# Patient Record
Sex: MALE | Race: WHITE | Employment: UNEMPLOYED | URBAN - NONMETROPOLITAN AREA
[De-identification: names, ages, dates, MRNs, and addresses within clinical notes are randomized per-mention and may not be internally consistent; named-entity substitution may affect disease eponyms.]

---

## 2017-01-03 ENCOUNTER — OFFICE VISIT (OUTPATIENT)
Dept: ORTHOPEDICS | Facility: CLINIC | Age: 22
End: 2017-01-03
Payer: COMMERCIAL

## 2017-01-03 VITALS — HEIGHT: 70 IN | BODY MASS INDEX: 36.36 KG/M2 | WEIGHT: 254 LBS

## 2017-01-03 DIAGNOSIS — G89.29 CHRONIC RIGHT SHOULDER PAIN: Primary | ICD-10-CM

## 2017-01-03 DIAGNOSIS — M25.511 CHRONIC RIGHT SHOULDER PAIN: Primary | ICD-10-CM

## 2017-01-03 DIAGNOSIS — S43.431D SUPERIOR GLENOID LABRUM LESION OF RIGHT SHOULDER, SUBSEQUENT ENCOUNTER: ICD-10-CM

## 2017-01-03 PROCEDURE — 99213 OFFICE O/P EST LOW 20 MIN: CPT | Performed by: PHYSICIAN ASSISTANT

## 2017-01-05 ENCOUNTER — HOSPITAL ENCOUNTER (OUTPATIENT)
Dept: PHYSICAL THERAPY | Facility: CLINIC | Age: 22
Setting detail: THERAPIES SERIES
End: 2017-01-05
Attending: NURSE PRACTITIONER
Payer: COMMERCIAL

## 2017-01-05 PROCEDURE — 97110 THERAPEUTIC EXERCISES: CPT | Mod: GP | Performed by: PHYSICAL THERAPIST

## 2017-01-05 PROCEDURE — 40000718 ZZHC STATISTIC PT DEPARTMENT ORTHO VISIT: Performed by: PHYSICAL THERAPIST

## 2017-01-12 ENCOUNTER — HOSPITAL ENCOUNTER (OUTPATIENT)
Dept: PHYSICAL THERAPY | Facility: CLINIC | Age: 22
Setting detail: THERAPIES SERIES
End: 2017-01-12
Attending: NURSE PRACTITIONER
Payer: COMMERCIAL

## 2017-01-12 PROCEDURE — 97110 THERAPEUTIC EXERCISES: CPT | Mod: GP | Performed by: PHYSICAL THERAPIST

## 2017-01-12 PROCEDURE — 40000718 ZZHC STATISTIC PT DEPARTMENT ORTHO VISIT: Performed by: PHYSICAL THERAPIST

## 2017-01-19 ENCOUNTER — HOSPITAL ENCOUNTER (OUTPATIENT)
Dept: PHYSICAL THERAPY | Facility: CLINIC | Age: 22
Setting detail: THERAPIES SERIES
End: 2017-01-19
Attending: NURSE PRACTITIONER
Payer: COMMERCIAL

## 2017-01-19 PROCEDURE — 97110 THERAPEUTIC EXERCISES: CPT | Mod: GP | Performed by: PHYSICAL THERAPIST

## 2017-01-19 PROCEDURE — 40000718 ZZHC STATISTIC PT DEPARTMENT ORTHO VISIT: Performed by: PHYSICAL THERAPIST

## 2017-01-26 ENCOUNTER — HOSPITAL ENCOUNTER (OUTPATIENT)
Dept: PHYSICAL THERAPY | Facility: CLINIC | Age: 22
Setting detail: THERAPIES SERIES
End: 2017-01-26
Attending: NURSE PRACTITIONER
Payer: COMMERCIAL

## 2017-01-26 PROCEDURE — 97110 THERAPEUTIC EXERCISES: CPT | Mod: GP | Performed by: PHYSICAL THERAPIST

## 2017-01-26 PROCEDURE — 40000718 ZZHC STATISTIC PT DEPARTMENT ORTHO VISIT: Performed by: PHYSICAL THERAPIST

## 2017-02-02 ENCOUNTER — HOSPITAL ENCOUNTER (OUTPATIENT)
Dept: PHYSICAL THERAPY | Facility: CLINIC | Age: 22
Setting detail: THERAPIES SERIES
End: 2017-02-02
Attending: NURSE PRACTITIONER
Payer: COMMERCIAL

## 2017-02-02 PROCEDURE — 97110 THERAPEUTIC EXERCISES: CPT | Mod: GP | Performed by: PHYSICAL THERAPIST

## 2017-02-02 PROCEDURE — 40000718 ZZHC STATISTIC PT DEPARTMENT ORTHO VISIT: Performed by: PHYSICAL THERAPIST

## 2017-02-09 ENCOUNTER — HOSPITAL ENCOUNTER (OUTPATIENT)
Dept: PHYSICAL THERAPY | Facility: CLINIC | Age: 22
Setting detail: THERAPIES SERIES
End: 2017-02-09
Attending: NURSE PRACTITIONER
Payer: COMMERCIAL

## 2017-02-09 PROCEDURE — 40000718 ZZHC STATISTIC PT DEPARTMENT ORTHO VISIT: Performed by: PHYSICAL THERAPIST

## 2017-02-09 PROCEDURE — 97110 THERAPEUTIC EXERCISES: CPT | Mod: GP | Performed by: PHYSICAL THERAPIST

## 2017-02-16 ENCOUNTER — HOSPITAL ENCOUNTER (OUTPATIENT)
Dept: PHYSICAL THERAPY | Facility: CLINIC | Age: 22
Setting detail: THERAPIES SERIES
End: 2017-02-16
Attending: NURSE PRACTITIONER
Payer: COMMERCIAL

## 2017-02-16 PROCEDURE — 97110 THERAPEUTIC EXERCISES: CPT | Mod: GP | Performed by: PHYSICAL THERAPIST

## 2017-02-16 PROCEDURE — 40000718 ZZHC STATISTIC PT DEPARTMENT ORTHO VISIT: Performed by: PHYSICAL THERAPIST

## 2017-02-28 ENCOUNTER — HOSPITAL ENCOUNTER (OUTPATIENT)
Dept: PHYSICAL THERAPY | Facility: CLINIC | Age: 22
Setting detail: THERAPIES SERIES
End: 2017-02-28
Attending: NURSE PRACTITIONER
Payer: COMMERCIAL

## 2017-02-28 PROCEDURE — 40000718 ZZHC STATISTIC PT DEPARTMENT ORTHO VISIT: Performed by: PHYSICAL THERAPIST

## 2017-02-28 PROCEDURE — 97110 THERAPEUTIC EXERCISES: CPT | Mod: GP | Performed by: PHYSICAL THERAPIST

## 2017-02-28 NOTE — PROGRESS NOTES
Outpatient Physical Therapy Discharge Note     Patient: Martell Dinh  : 1995    Beginning/End Dates of Reporting Period:  2016 to 2017    Referring Provider: Jarred Bernal Diagnosis: R Shoulder Pain     Client Self Report: Patient reports his shoulder was painful last week, even without a repetitive motion. He  states intensity wasn't that high, but it did ache a little    Goals:  Goal Identifier HEP   Goal Description Patient will be independent and 75% compliant in progressive HEP to continue improving strength outside of therapy session   Target Date 16   Date Met  16   Progress:     Goal Identifier Sleep   Goal Description Patient will report ability to sleep on affected shoulder with no increase in shoulder pain   Target Date 17   Date Met  (P) 17 (patient states it occurs occasionally, tolerable.)   Progress:     Goal Identifier Exercise   Goal Description Patient will demonstrate knowledge of appropriate exercise program as to not increase shoulder pain   Target Date 17   Date Met  17   Progress:     Goal Identifier SPADI   Goal Description Patient will report improvement in Shoulder Pain and Disability index to less than 5% to decrease level of disability   Target Date 17   Date Met  17   Progress:     Goal Identifier     Goal Description     Target Date     Date Met      Progress:     Goal Identifier     Goal Description     Target Date     Date Met      Progress:     Goal Identifier     Goal Description     Target Date     Date Met      Progress:     Goal Identifier     Goal Description     Target Date     Date Met      Progress:     Progress Toward Goals:   Progress this reporting period: Pt has progressed to active strengthening within a pain free range and symptoms that are self managed by the patient. Pt has demonstrated gains with functional strength and indicated increased participation with work without  difficulty.          Plan:  Discharge from therapy.    Discharge:    Reason for Discharge: Patient has met all goals.    Equipment Issued: Blue Therapy Band    Discharge Plan: Patient to continue home program.

## 2017-07-26 ENCOUNTER — OFFICE VISIT (OUTPATIENT)
Dept: FAMILY MEDICINE | Facility: CLINIC | Age: 22
End: 2017-07-26
Payer: COMMERCIAL

## 2017-07-26 ENCOUNTER — RADIANT APPOINTMENT (OUTPATIENT)
Dept: GENERAL RADIOLOGY | Facility: CLINIC | Age: 22
End: 2017-07-26
Attending: NURSE PRACTITIONER
Payer: COMMERCIAL

## 2017-07-26 VITALS
DIASTOLIC BLOOD PRESSURE: 72 MMHG | HEIGHT: 70 IN | BODY MASS INDEX: 35.22 KG/M2 | TEMPERATURE: 98.6 F | WEIGHT: 246 LBS | SYSTOLIC BLOOD PRESSURE: 124 MMHG | HEART RATE: 72 BPM

## 2017-07-26 DIAGNOSIS — S69.91XA HAND INJURY, RIGHT, INITIAL ENCOUNTER: ICD-10-CM

## 2017-07-26 DIAGNOSIS — S62.346A CLOSED NONDISPLACED FRACTURE OF BASE OF FIFTH METACARPAL BONE OF RIGHT HAND, INITIAL ENCOUNTER: Primary | ICD-10-CM

## 2017-07-26 PROCEDURE — 73130 X-RAY EXAM OF HAND: CPT | Mod: RT

## 2017-07-26 PROCEDURE — 99214 OFFICE O/P EST MOD 30 MIN: CPT | Performed by: NURSE PRACTITIONER

## 2017-07-26 NOTE — PATIENT INSTRUCTIONS
Follow-up with orthopedics they will contact you for an appointment.    Rest the affected painful area as much as possible.  Apply ice for 15-20 minutes intermittently as needed and especially after any offending activity.    Daily stretching.  As pain recedes, begin normal activities slowly as tolerated.      Avoid rapid or heavy exertion for now. Activity as tolerated     Gentle stretching two to three times daily just to keep from stiffening up.      Alternate ice and heat, 20 minutes each for 2-3 cycles.  Gel packs work best for cold. Uncooked rice is best for heat.  Fill an old, clean sock and tie or sew up.  Microwave for 30-45 seconds. Careful not to burn.    Gradually ease back into activity as it gets better.  Consider Physical Therapy if symptoms not better with symptomatic care. Will need to follow-up with your primary care provider for a referral if not improving,        Boxer Fracture  You have a fracture, or break, of one of the bones in your hand. This causes pain, swelling, and sometimes bruising. This injury is treated with a splint or cast. It takes about 4 to 6 weeks to heal. Surgery may be needed for severe injuries.    If there are wounds near the fractured joint from hitting someone in the mouth, antibiotics may be required to prevent an infection. After the bone has healed, it is common for one knuckle to be slightly lower than the others, even if the bone was set. This may be seen only when you make a fist. It usually won t affect hand function.  Home care    Keep your arm elevated to reduce pain and swelling. When sitting or lying down, elevate your arm above the level of your heart. You can do this by placing your arm on a pillow that rests on your chest or on a pillow at your side. This is most important during the first 48 hours after injury.    Apply an ice pack over the injured area for no more than 20 minutes. Do this every 3 to 6 hours for the first 24 to 48 hours. To make an ice  pack, put ice cubes in a plastic bag that seals at the top. Wrap the bag in a clean, thin towel or cloth. Never put ice or an ice pack directly on the skin. You can place the ice pack inside the sling and directly over the splint or cast. As the ice melts, be careful that the cast or splint doesn t get wet.    Keep the cast or splint dry at all times. Bathe with your cast or splint out of the water, protected with 2 large plastic bags. Place 1 bag around the other. Tape each bag with duct tape at the top end. Even when the cast or splint is covered, water can leak in. So it's best to keep the cast or splint away from water. If a fiberglass cast or splint gets wet, you can dry it with a hair dryer on a cool setting.    You may use over-the-counter pain medicine to control pain, unless another pain medicine was prescribed. Talk with your providerbefore using these medicines if you have chronic liver or kidney disease, or ever had a stomach ulcer or GI bleeding.    If you cut, punctured, or scraped your hand during this injury, there is a risk of infection. Watch for signs of infection listed below. Finish any antibiotics prescribed.  Follow-up care  Follow up with your healthcare provider within 1 week, or as advised. This is to be sure the bone is healing as it should.   If X-rays were taken, you will be told of any new findings that may affect your care.  When to seek medical advice  Call your healthcare provider right away if any of these occur:    The cast or splint becomes wet or soft    The cast becomes loose    There is increased tightness or pain under the cast or splint    Your fingers become swollen, cold, blue, numb or tingly    The splint or cast has a bad smell, or wound drainage stains the cast    You have signs of infection: Fever, redness, warmth, swelling, or drainage from the wound    You have a fever of 100.4 F (38 C) or above lasting for 24 to 48 hours  Date Last Reviewed: 11/25/2015 2000-2017  [employer]. [address] [place] [address]. All rights reserved. This information is not intended as a substitute for professional medical care. Always follow your healthcare professional's instructions.        Boxer s Fracture  A boxer s fracture is a break in a bone in outer edge of the hand. The bone is under the pinky finger bones. Boxer s fracture gets its name because it is often caused by punching a hard surface with the fist.  Understanding the bones of the hand  The bones of your hand are called metacarpal bones. They connect the bones of your fingers (phalanges) to the bones of your wrist (carpals). The fifth metacarpal is the metacarpal of the fifth finger (pinky). A metacarpal bone has a long section of the bone (shaft) connected to the end of the bone. The area where the shaft connects to the end of the bone is called the neck. The neck is the weakest point of the bone. This is where a boxer s fracture happens.  What causes boxer s fracture?  You may have a boxer s fracture from an injury. This most often happens from punching a hard object, such as a punching bag, wall, or other firm surface. You may also get a boxer s fracture if you fall on your closed fist.  Symptoms of boxer s fracture  Symptoms of a boxer s fracture can include:    Painful bruising and swelling of the hand    Bent, claw-like pinky finger that is out of its normal position    Limited movement (range of motion) of the ring (fourth) and pinky fingers    Change in the shape of the knuckle  Diagnosing boxer s fracture  Your health care provider will ask about your symptoms and about how you injured the hand. He or she will also examine your hand. You may have an X-ray of the hand. This uses a small amount of radiation to create an image of the bones.  Treatment for boxer s fracture  Your health care provider may refer you to a hand orthopedist. This is a doctor who treats hand problems. Your treatment may  first include:    Cleaning any cuts    A tetanus vaccine, if you have cuts    Resting your hand and keeping it raised (elevated) as much as possible for a few days    Putting ice on it throughout the day    Taking prescription or over-the-counter pain medicine    Wearing a splint for several weeks  You may need to have your bones put back into position. You may have a local pain medicine to keep you from feeling pain during this process. Your health care provider will then move the bones back into place.  Surgery for boxer s fracture  You made need surgery. This is done by a hand surgeon. The surgeon makes a cut (incision) in the skin in order to put your bones back into place. You may need surgery if:    The bone has broken through the skin    The bone is broken in several places    You use your hands and fingers for your work. For example, if you are a musician, craftsman, or .    Your hand doesn t heal normally  After surgery, you will have physical therapy to help you heal. The therapy includes treatments and exercises.  What happens if you don t get treated?  An untreated boxer s fracture can cause problems such as:    You may be less able to  objects.    You may not be able to move your hand or finger as much as you did before the injury.    Your finger may not look normal.  Preventing boxer s fracture  If you box, make sure you use the correct technique and the proper equipment.  How to manage boxer s fracture  Your health care provider may tell you to:    Keep your splint from getting wet.    Keep your bones strong and help your fracture heal. Eat foods with vitamin D, calcium, and protein.    Stop smoking. If you smoke, your fracture may not heal as quickly.    Be careful while your hand heals. You may need to use a brace for a while.     When to call the health care provider  Call your health care provider right away if you have any of these:    Numbness or tingling in your fingers    Fingers that  look blue    Pain or swelling that gets worse    Problems with your splint    Skin in the area is that is red, painful, or swollen   Date Last Reviewed: 3/19/2015    3436-3821 [employer]. [address] [place] [address], [place] [address]. All rights reserved. This information is not intended as a substitute for professional medical care. Always follow your healthcare professional's instructions.

## 2017-07-26 NOTE — PROGRESS NOTES
"  SUBJECTIVE:                                                    Martell Dinh is a 21 year old male who presents to clinic today for the following health issues:    Hand Pain    Onset: one week    Description:   Location: right hand  Character: Dull ache    Intensity: moderate    Progression of Symptoms: same    Accompanying Signs & Symptoms:  Other symptoms: swelling    History:   Previous similar pain: no       Precipitating factors:   Trauma or overuse: YES- punched the side of a trailer    Alleviating factors:  Improved by: nothing    Therapies Tried and outcome: ice  Patient is right handed         History reviewed. No pertinent past medical history.    Social History   Substance Use Topics     Smoking status: Never Smoker     Smokeless tobacco: Never Used      Comment: mom smokes     Alcohol use No       ROS:  CONSTITUTIONAL:NEGATIVE for fever, chills, change in weight  INTEGUMENTARY/SKIN: NEGATIVE for worrisome rashes, moles or lesions  EYES: NEGATIVE for vision changes or irritation  ENT/MOUTH: NEGATIVE for ear, mouth and throat problems  RESP:NEGATIVE for significant cough or SOB  CV: NEGATIVE for chest pain, palpitations or peripheral edema  MUSCULOSKELETAL: NEGATIVE for significant arthralgias or myalgia  NEURO: NEGATIVE for weakness, dizziness or paresthesias      OBJECTIVE:  Blood pressure 124/72, pulse 72, temperature 98.6  F (37  C), temperature source Tympanic, height 5' 9.5\" (1.765 m), weight 246 lb (111.6 kg).   EXAM:  Constitutional: healthy, alert and no distress   Cardiovascular: negative, PMI normal. No lifts, heaves, or thrills. RRR. No murmurs, clicks gallops or rub  Respiratory: negative, Percussion normal. Good diaphragmatic excursion. Lungs clear  NEURO: Gait normal. Reflexes normal and symmetric. Sensation grossly WNL.    Appearance: in no apparent distress.  Hand exam: soft tissue tenderness and swelling at the base of the 5 metacarpal, reduced range of motion of 5th finger , " scaphoid (snuffbox) tenderness absent, radial pulse normal, sensation normal, remainder of ipsilateral wrist, hand and finger exam is normal, normal contralateral hand and wrist, normal ipsilateral elbow.    X-ray:  RIGHT HAND THREE VIEWS  7/26/2017 4:28 PM     HISTORY:  Pain after injury to the fourth and fifth metacarpals.     COMPARISON:  9/29/2011.     FINDINGS:  There has been interval reduction of the previously seen  dislocation of the PIP joint of the little finger. There is an  approximately 0.4 cm ossification along the volar radial aspect of the  base of the middle phalanx of the little finger which may be an old  ununited fracture fragment. There is an acute appearing transverse  fracture through the distal fifth metacarpal with mild volar  angulation of the distal fragment. No significant displacement is  seen. No other change or abnormality is noted.         IMPRESSION:  Acute fracture of the distal fifth metacarpal.     ASSESSMENT:    ICD-10-CM    1. Closed nondisplaced fracture of base of fifth metacarpal bone of right hand, initial encounter S62.346A ORTHO  REFERRAL   2. Hand injury, right, initial encounter S69.91XA XR Hand Right G/E 3 Views         PLAN:  Patient placed in a boxer splint made by myself.  Will have him follow-up with Orthopedics   Patient Instructions     Follow-up with orthopedics they will contact you for an appointment.    Rest the affected painful area as much as possible.  Apply ice for 15-20 minutes intermittently as needed and especially after any offending activity.    Daily stretching.  As pain recedes, begin normal activities slowly as tolerated.      Avoid rapid or heavy exertion for now. Activity as tolerated     Gentle stretching two to three times daily just to keep from stiffening up.      Alternate ice and heat, 20 minutes each for 2-3 cycles.  Gel packs work best for cold. Uncooked rice is best for heat.  Fill an old, clean sock and tie or sew up.   Microwave for 30-45 seconds. Careful not to burn.    Gradually ease back into activity as it gets better.  Consider Physical Therapy if symptoms not better with symptomatic care. Will need to follow-up with your primary care provider for a referral if not improving,        Boxer Fracture  You have a fracture, or break, of one of the bones in your hand. This causes pain, swelling, and sometimes bruising. This injury is treated with a splint or cast. It takes about 4 to 6 weeks to heal. Surgery may be needed for severe injuries.    If there are wounds near the fractured joint from hitting someone in the mouth, antibiotics may be required to prevent an infection. After the bone has healed, it is common for one knuckle to be slightly lower than the others, even if the bone was set. This may be seen only when you make a fist. It usually won t affect hand function.  Home care    Keep your arm elevated to reduce pain and swelling. When sitting or lying down, elevate your arm above the level of your heart. You can do this by placing your arm on a pillow that rests on your chest or on a pillow at your side. This is most important during the first 48 hours after injury.    Apply an ice pack over the injured area for no more than 20 minutes. Do this every 3 to 6 hours for the first 24 to 48 hours. To make an ice pack, put ice cubes in a plastic bag that seals at the top. Wrap the bag in a clean, thin towel or cloth. Never put ice or an ice pack directly on the skin. You can place the ice pack inside the sling and directly over the splint or cast. As the ice melts, be careful that the cast or splint doesn t get wet.    Keep the cast or splint dry at all times. Bathe with your cast or splint out of the water, protected with 2 large plastic bags. Place 1 bag around the other. Tape each bag with duct tape at the top end. Even when the cast or splint is covered, water can leak in. So it's best to keep the cast or splint away from  water. If a fiberglass cast or splint gets wet, you can dry it with a hair dryer on a cool setting.    You may use over-the-counter pain medicine to control pain, unless another pain medicine was prescribed. Talk with your providerbefore using these medicines if you have chronic liver or kidney disease, or ever had a stomach ulcer or GI bleeding.    If you cut, punctured, or scraped your hand during this injury, there is a risk of infection. Watch for signs of infection listed below. Finish any antibiotics prescribed.  Follow-up care  Follow up with your healthcare provider within 1 week, or as advised. This is to be sure the bone is healing as it should.   If X-rays were taken, you will be told of any new findings that may affect your care.  When to seek medical advice  Call your healthcare provider right away if any of these occur:    The cast or splint becomes wet or soft    The cast becomes loose    There is increased tightness or pain under the cast or splint    Your fingers become swollen, cold, blue, numb or tingly    The splint or cast has a bad smell, or wound drainage stains the cast    You have signs of infection: Fever, redness, warmth, swelling, or drainage from the wound    You have a fever of 100.4 F (38 C) or above lasting for 24 to 48 hours  Date Last Reviewed: 11/25/2015 2000-2017 [employer]. [address] [place] [address]. All rights reserved. This information is not intended as a substitute for professional medical care. Always follow your healthcare professional's instructions.        Boxer s Fracture  A boxer s fracture is a break in a bone in outer edge of the hand. The bone is under the pinky finger bones. Boxer s fracture gets its name because it is often caused by punching a hard surface with the fist.  Understanding the bones of the hand  The bones of your hand are called metacarpal bones. They connect the bones of your fingers (phalanges) to the bones of your  wrist (carpals). The fifth metacarpal is the metacarpal of the fifth finger (pinky). A metacarpal bone has a long section of the bone (shaft) connected to the end of the bone. The area where the shaft connects to the end of the bone is called the neck. The neck is the weakest point of the bone. This is where a boxer s fracture happens.  What causes boxer s fracture?  You may have a boxer s fracture from an injury. This most often happens from punching a hard object, such as a punching bag, wall, or other firm surface. You may also get a boxer s fracture if you fall on your closed fist.  Symptoms of boxer s fracture  Symptoms of a boxer s fracture can include:    Painful bruising and swelling of the hand    Bent, claw-like pinky finger that is out of its normal position    Limited movement (range of motion) of the ring (fourth) and pinky fingers    Change in the shape of the knuckle  Diagnosing boxer s fracture  Your health care provider will ask about your symptoms and about how you injured the hand. He or she will also examine your hand. You may have an X-ray of the hand. This uses a small amount of radiation to create an image of the bones.  Treatment for boxer s fracture  Your health care provider may refer you to a hand orthopedist. This is a doctor who treats hand problems. Your treatment may first include:    Cleaning any cuts    A tetanus vaccine, if you have cuts    Resting your hand and keeping it raised (elevated) as much as possible for a few days    Putting ice on it throughout the day    Taking prescription or over-the-counter pain medicine    Wearing a splint for several weeks  You may need to have your bones put back into position. You may have a local pain medicine to keep you from feeling pain during this process. Your health care provider will then move the bones back into place.  Surgery for boxer s fracture  You made need surgery. This is done by a hand surgeon. The surgeon makes a cut (incision)  in the skin in order to put your bones back into place. You may need surgery if:    The bone has broken through the skin    The bone is broken in several places    You use your hands and fingers for your work. For example, if you are a musician, craftsman, or .    Your hand doesn t heal normally  After surgery, you will have physical therapy to help you heal. The therapy includes treatments and exercises.  What happens if you don t get treated?  An untreated boxer s fracture can cause problems such as:    You may be less able to  objects.    You may not be able to move your hand or finger as much as you did before the injury.    Your finger may not look normal.  Preventing boxer s fracture  If you box, make sure you use the correct technique and the proper equipment.  How to manage boxer s fracture  Your health care provider may tell you to:    Keep your splint from getting wet.    Keep your bones strong and help your fracture heal. Eat foods with vitamin D, calcium, and protein.    Stop smoking. If you smoke, your fracture may not heal as quickly.    Be careful while your hand heals. You may need to use a brace for a while.     When to call the health care provider  Call your health care provider right away if you have any of these:    Numbness or tingling in your fingers    Fingers that look blue    Pain or swelling that gets worse    Problems with your splint    Skin in the area is that is red, painful, or swollen   Date Last Reviewed: 3/19/2015    [id number] [employer]. [address] [place] [address]. All rights reserved. This information is not intended as a substitute for professional medical care. Always follow your healthcare professional's instructions.              SPENSER Singh CNP

## 2017-07-26 NOTE — NURSING NOTE
"Chief Complaint   Patient presents with     Hand Injury       Initial /72 (BP Location: Right arm, Cuff Size: Adult Large)  Pulse 72  Temp 98.6  F (37  C) (Tympanic)  Ht 5' 9.5\" (1.765 m)  Wt 246 lb (111.6 kg)  BMI 35.81 kg/m2 Estimated body mass index is 35.81 kg/(m^2) as calculated from the following:    Height as of this encounter: 5' 9.5\" (1.765 m).    Weight as of this encounter: 246 lb (111.6 kg).  Medication Reconciliation: complete    Health Maintenance that is potentially due pending provider review:  NONE    Lizabeth Saavedra MA     Is there anyone who you would like to be able to receive your results? No  If yes have patient fill out SCOTT    Lizabeth Saavedra MA     "

## 2017-07-26 NOTE — MR AVS SNAPSHOT
After Visit Summary   7/26/2017    Martell Dinh    MRN: [id number]           Patient Information     Date Of Birth          1995        Visit Information        Provider Department      7/26/2017 4:00 PM Kamille Antunez APRN [facility]        Today's Diagnoses     Hand injury, right, initial encounter    -  1    Closed displaced fracture of neck of fifth metacarpal bone of right hand, initial encounter          Care Instructions    Follow-up with orthopedics they will contact you for an appointment.    Rest the affected painful area as much as possible.  Apply ice for 15-20 minutes intermittently as needed and especially after any offending activity.    Daily stretching.  As pain recedes, begin normal activities slowly as tolerated.      Avoid rapid or heavy exertion for now. Activity as tolerated     Gentle stretching two to three times daily just to keep from stiffening up.      Alternate ice and heat, 20 minutes each for 2-3 cycles.  Gel packs work best for cold. Uncooked rice is best for heat.  Fill an old, clean sock and tie or sew up.  Microwave for 30-45 seconds. Careful not to burn.    Gradually ease back into activity as it gets better.  Consider Physical Therapy if symptoms not better with symptomatic care. Will need to follow-up with your primary care provider for a referral if not improving,        Boxer Fracture  You have a fracture, or break, of one of the bones in your hand. This causes pain, swelling, and sometimes bruising. This injury is treated with a splint or cast. It takes about 4 to 6 weeks to heal. Surgery may be needed for severe injuries.    If there are wounds near the fractured joint from hitting someone in the mouth, antibiotics may be required to prevent an infection. After the bone has healed, it is common for one knuckle to be slightly lower than the others, even if the bone was set. This may be seen only when you make a fist. It  usually won t affect hand function.  Home care    Keep your arm elevated to reduce pain and swelling. When sitting or lying down, elevate your arm above the level of your heart. You can do this by placing your arm on a pillow that rests on your chest or on a pillow at your side. This is most important during the first 48 hours after injury.    Apply an ice pack over the injured area for no more than 20 minutes. Do this every 3 to 6 hours for the first 24 to 48 hours. To make an ice pack, put ice cubes in a plastic bag that seals at the top. Wrap the bag in a clean, thin towel or cloth. Never put ice or an ice pack directly on the skin. You can place the ice pack inside the sling and directly over the splint or cast. As the ice melts, be careful that the cast or splint doesn t get wet.    Keep the cast or splint dry at all times. Bathe with your cast or splint out of the water, protected with 2 large plastic bags. Place 1 bag around the other. Tape each bag with duct tape at the top end. Even when the cast or splint is covered, water can leak in. So it's best to keep the cast or splint away from water. If a fiberglass cast or splint gets wet, you can dry it with a hair dryer on a cool setting.    You may use over-the-counter pain medicine to control pain, unless another pain medicine was prescribed. Talk with your providerbefore using these medicines if you have chronic liver or kidney disease, or ever had a stomach ulcer or GI bleeding.    If you cut, punctured, or scraped your hand during this injury, there is a risk of infection. Watch for signs of infection listed below. Finish any antibiotics prescribed.  Follow-up care  Follow up with your healthcare provider within 1 week, or as advised. This is to be sure the bone is healing as it should.   If X-rays were taken, you will be told of any new findings that may affect your care.  When to seek medical advice  Call your healthcare provider right away if any of  these occur:    The cast or splint becomes wet or soft    The cast becomes loose    There is increased tightness or pain under the cast or splint    Your fingers become swollen, cold, blue, numb or tingly    The splint or cast has a bad smell, or wound drainage stains the cast    You have signs of infection: Fever, redness, warmth, swelling, or drainage from the wound    You have a fever of 100.4 F (38 C) or above lasting for 24 to 48 hours  Date Last Reviewed: 11/25/2015 2000-2017 [employer]. [address] [place], [place] [address]. All rights reserved. This information is not intended as a substitute for professional medical care. Always follow your healthcare professional's instructions.        Boxer s Fracture  A boxer s fracture is a break in a bone in outer edge of the hand. The bone is under the pinky finger bones. Boxer s fracture gets its name because it is often caused by punching a hard surface with the fist.  Understanding the bones of the hand  The bones of your hand are called metacarpal bones. They connect the bones of your fingers (phalanges) to the bones of your wrist (carpals). The fifth metacarpal is the metacarpal of the fifth finger (pinky). A metacarpal bone has a long section of the bone (shaft) connected to the end of the bone. The area where the shaft connects to the end of the bone is called the neck. The neck is the weakest point of the bone. This is where a boxer s fracture happens.  What causes boxer s fracture?  You may have a boxer s fracture from an injury. This most often happens from punching a hard object, such as a punching bag, wall, or other firm surface. You may also get a boxer s fracture if you fall on your closed fist.  Symptoms of boxer s fracture  Symptoms of a boxer s fracture can include:    Painful bruising and swelling of the hand    Bent, claw-like pinky finger that is out of its normal position    Limited movement (range of motion) of the ring  (fourth) and pinky fingers    Change in the shape of the knuckle  Diagnosing boxer s fracture  Your health care provider will ask about your symptoms and about how you injured the hand. He or she will also examine your hand. You may have an X-ray of the hand. This uses a small amount of radiation to create an image of the bones.  Treatment for boxer s fracture  Your health care provider may refer you to a hand orthopedist. This is a doctor who treats hand problems. Your treatment may first include:    Cleaning any cuts    A tetanus vaccine, if you have cuts    Resting your hand and keeping it raised (elevated) as much as possible for a few days    Putting ice on it throughout the day    Taking prescription or over-the-counter pain medicine    Wearing a splint for several weeks  You may need to have your bones put back into position. You may have a local pain medicine to keep you from feeling pain during this process. Your health care provider will then move the bones back into place.  Surgery for boxer s fracture  You made need surgery. This is done by a hand surgeon. The surgeon makes a cut (incision) in the skin in order to put your bones back into place. You may need surgery if:    The bone has broken through the skin    The bone is broken in several places    You use your hands and fingers for your work. For example, if you are a musician, craftsman, or .    Your hand doesn t heal normally  After surgery, you will have physical therapy to help you heal. The therapy includes treatments and exercises.  What happens if you don t get treated?  An untreated boxer s fracture can cause problems such as:    You may be less able to  objects.    You may not be able to move your hand or finger as much as you did before the injury.    Your finger may not look normal.  Preventing boxer s fracture  If you box, make sure you use the correct technique and the proper equipment.  How to manage boxer s fracture  Your  health care provider may tell you to:    Keep your splint from getting wet.    Keep your bones strong and help your fracture heal. Eat foods with vitamin D, calcium, and protein.    Stop smoking. If you smoke, your fracture may not heal as quickly.    Be careful while your hand heals. You may need to use a brace for a while.     When to call the health care provider  Call your health care provider right away if you have any of these:    Numbness or tingling in your fingers    Fingers that look blue    Pain or swelling that gets worse    Problems with your splint    Skin in the area is that is red, painful, or swollen   Date Last Reviewed: 3/19/2015    8483-4183 [employer]. [address] [place] [address], [place] [address]. All rights reserved. This information is not intended as a substitute for professional medical care. Always follow your healthcare professional's instructions.                Follow-ups after your visit        Additional Services     ORTHO  REFERRAL       [facility] is referring you to the Orthopedic  Services at [facility] [place].        The  Representative will assist you in the coordination of your Orthopedic and Musculoskeletal Care as prescribed by your physician.    The  Representative will call you within 1 business day to help schedule your appointment, or you may contact the  Representative at:    All areas ~ [phone]     Type of Referral : Non Surgical       Timeframe requested: 1 - 2 days Hand specialist     Coverage of these services is subject to the terms and limitations of your health insurance plan.  Please call member services at your health plan with any benefit or coverage questions.      If X-rays, CT or MRI's have been performed, please contact the facility where they were done to arrange for , prior to your scheduled appointment.  Please bring this referral request to your  "appointment and present it to your specialist.                  Who to contact     If you have questions or need follow up information about today's clinic visit or your schedule please contact [facility] directly at [phone].  Normal or non-critical lab and imaging results will be communicated to you by MyChart, letter or phone within 4 business days after the clinic has received the results. If you do not hear from us within 7 days, please contact the clinic through MyChart or phone. If you have a critical or abnormal lab result, we will notify you by phone as soon as possible.  Submit refill requests through [employer] or call your pharmacy and they will forward the refill request to us. Please allow 3 business days for your refill to be completed.          Additional Information About Your Visit        [employer] [facility] Center[facility] Information     [employer] lets you send messages to your doctor, view your test results, renew your prescriptions, schedule appointments and more. To sign up, go to [email or web address] . Click on \"Log in\" on the left side of the screen, which will take you to the Welcome page. Then click on \"Sign up Now\" on the right side of the page.     You will be asked to enter the access code listed below, as well as some personal information. Please follow the directions to create your username and password.     Your access code is: [email or web address]  Expires: 10/24/2017  5:07 PM     Your access code will  in 90 days. If you need help or a new code, please call your [facility] or [phone].        Care EveryWhere ID     This is your Care EveryWhere ID. This could be used by other organizations to access your [facility] medical records  [id number]        Your Vitals Were     Pulse Temperature Height BMI (Body Mass Index)          72 98.6  F (37  C) (Tympanic) 5' 9.5\" (1.765 m) 35.81 kg/m2         Blood Pressure from Last 3 Encounters:   17 124/72   16 115/75   16 106/55    " Weight from Last 3 Encounters:   07/26/17 246 lb (111.6 kg) (>99 %)*   01/03/17 254 lb (115.2 kg) (>99 %)*   12/09/16 254 lb (115.2 kg) (>99 %)*     * Growth percentiles are based on [facility] 2-20 Years data.              We Performed the Following     ORTHO  REFERRAL        Primary Care Provider Office Phone # Fax #    Gianfranco Jaffe [phone]       [facility] [address] [facility] [address]        Equal Access to Services     [facility] Center[facility] : Hadii aad ku hadasho Soomaali, waaxda luqadaha, qaybta kaalmada adelisethyarobert, ruthann alcantara . So [facility] [phone].    ATENCIÓN: Si habla español, tiene a langford disposición servicios gratuitos de asistencia lingüística. Llame al [phone].    We comply with applicable federal civil rights laws and [place] laws. We do not discriminate on the basis of race, color, national origin, age, disability sex, sexual orientation or gender identity.            Thank you!     Thank you for choosing [facility]  for your care. Our goal is always to provide you with excellent care. Hearing back from our patients is one way we can continue to improve our services. Please take a few minutes to complete the written survey that you may receive in the mail after your visit with us. Thank you!             Your Updated Medication List - Protect others around you: Learn how to safely use, store and throw away your medicines at www.disposemymeds.org.      Notice  As of 7/26/2017  5:07 PM    You have not been prescribed any medications.

## 2017-07-27 ASSESSMENT — ANXIETY QUESTIONNAIRES
1. FEELING NERVOUS, ANXIOUS, OR ON EDGE: NOT AT ALL
7. FEELING AFRAID AS IF SOMETHING AWFUL MIGHT HAPPEN: NOT AT ALL
5. BEING SO RESTLESS THAT IT IS HARD TO SIT STILL: NOT AT ALL
GAD7 TOTAL SCORE: 0
2. NOT BEING ABLE TO STOP OR CONTROL WORRYING: NOT AT ALL
3. WORRYING TOO MUCH ABOUT DIFFERENT THINGS: NOT AT ALL
6. BECOMING EASILY ANNOYED OR IRRITABLE: NOT AT ALL
IF YOU CHECKED OFF ANY PROBLEMS ON THIS QUESTIONNAIRE, HOW DIFFICULT HAVE THESE PROBLEMS MADE IT FOR YOU TO DO YOUR WORK, TAKE CARE OF THINGS AT HOME, OR GET ALONG WITH OTHER PEOPLE: NOT DIFFICULT AT ALL

## 2017-07-27 ASSESSMENT — PATIENT HEALTH QUESTIONNAIRE - PHQ9: 5. POOR APPETITE OR OVEREATING: NOT AT ALL

## 2017-07-28 ASSESSMENT — ANXIETY QUESTIONNAIRES: GAD7 TOTAL SCORE: 0

## 2017-07-28 ASSESSMENT — PATIENT HEALTH QUESTIONNAIRE - PHQ9: SUM OF ALL RESPONSES TO PHQ QUESTIONS 1-9: 0

## 2018-06-12 ENCOUNTER — OFFICE VISIT (OUTPATIENT)
Dept: FAMILY MEDICINE | Facility: CLINIC | Age: 23
End: 2018-06-12
Payer: COMMERCIAL

## 2018-06-12 VITALS
DIASTOLIC BLOOD PRESSURE: 94 MMHG | HEART RATE: 88 BPM | TEMPERATURE: 97.8 F | WEIGHT: 249 LBS | SYSTOLIC BLOOD PRESSURE: 136 MMHG | RESPIRATION RATE: 18 BRPM | BODY MASS INDEX: 35.65 KG/M2 | HEIGHT: 70 IN

## 2018-06-12 DIAGNOSIS — E66.812 CLASS 2 OBESITY WITHOUT SERIOUS COMORBIDITY WITH BODY MASS INDEX (BMI) OF 36.0 TO 36.9 IN ADULT, UNSPECIFIED OBESITY TYPE: ICD-10-CM

## 2018-06-12 DIAGNOSIS — Z00.00 ROUTINE GENERAL MEDICAL EXAMINATION AT A HEALTH CARE FACILITY: Primary | ICD-10-CM

## 2018-06-12 DIAGNOSIS — R03.0 ELEVATED BLOOD PRESSURE READING WITHOUT DIAGNOSIS OF HYPERTENSION: ICD-10-CM

## 2018-06-12 PROCEDURE — 99395 PREV VISIT EST AGE 18-39: CPT | Performed by: FAMILY MEDICINE

## 2018-06-12 NOTE — PROGRESS NOTES
SUBJECTIVE:   CC: Martell Dinh is an 22 year old male who presents for preventative health visit.     Healthy Habits:    Do you get at least three servings of calcium containing foods daily (dairy, green leafy vegetables, etc.)? yes    Amount of exercise or daily activities, outside of work: 5 day(s) per week    Problems taking medications regularly not applicable    Medication side effects: No    Have you had an eye exam in the past two years? yes    Do you see a dentist twice per year? yes    Do you have sleep apnea, excessive snoring or daytime drowsiness?no       PROBLEMS TO ADD ON...  None    Today's PHQ-2 Score:   PHQ-2 ( 1999 Pf[employer]) 6/12/2018   Q1: Little interest or pleasure in doing things 0   Q2: Feeling down, depressed or hopeless 0   PHQ-2 Score 0       Abuse: Current or Past(Physical, Sexual or Emotional)- No  Do you feel safe in your environment - Yes    Social History   Substance Use Topics     Smoking status: Never Smoker     Smokeless tobacco: Never Used      Comment: mom smokes     Alcohol use Yes      Comment: ocassional      If you drink alcohol do you typically have >3 drinks per day or >7 drinks per week? No                      Last PSA: No results found for: PSA    Reviewed orders with patient. Reviewed health maintenance and updated orders accordingly - Yes  Labs reviewed in EPIC  BP Readings from Last 3 Encounters:   06/12/18 (!) 136/94   07/26/17 124/72   12/09/16 115/75    Wt Readings from Last 3 Encounters:   06/12/18 249 lb (112.9 kg)   07/26/17 246 lb (111.6 kg) (>99 %)*   01/03/17 254 lb (115.2 kg) (>99 %)*     * Growth percentiles are based on CDC 2-20 Years data.                  Patient Active Problem List   Diagnosis     Chronic right shoulder pain     Elevated blood pressure reading without diagnosis of hypertension     Past Surgical History:   Procedure Laterality Date     SURGICAL HISTORY OF -   1997    Circumcision       Social History   Substance Use Topics      "Smoking status: Never Smoker     Smokeless tobacco: Never Used      Comment: mom smokes     Alcohol use Yes      Comment: ocassional     Family History   Problem Relation Age of Onset     HEART DISEASE Mother      Lipids Mother      DIABETES Maternal Grandmother      Hypertension Maternal Grandmother          No current outpatient prescriptions on file.     No Known Allergies  No lab results found.     Reviewed and updated as needed this visit by clinical staff  Tobacco  Allergies  Meds  Med Hx  Surg Hx  Fam Hx  Soc Hx        Reviewed and updated as needed this visit by Provider            ROS:  CONSTITUTIONAL:NEGATIVE for fever, chills, change in weight  INTEGUMENTARY/SKIN: NEGATIVE for worrisome rashes, moles or lesions  EYES: NEGATIVE for vision changes or irritation  ENT: NEGATIVE for ear, mouth and throat problems  RESP: NEGATIVE for significant cough or SOB  CV: NEGATIVE for chest pain, palpitations or peripheral edema  GI: NEGATIVE for nausea, abdominal pain, heartburn, or change in bowel habits   male: negative for dysuria, hematuria, decreased urinary stream, erectile dysfunction, urethral discharge  MUSCULOSKELETAL: NEGATIVE for significant arthralgias or myalgia  NEURO: NEGATIVE for weakness, dizziness or paresthesias  PSYCHIATRIC: NEGATIVE for changes in mood or affect    OBJECTIVE:   BP (!) 136/94 (Cuff Size: Adult Regular)  Pulse 88  Temp 97.8  F (36.6  C) (Tympanic)  Resp 18  Ht 5' 9.5\" (1.765 m)  Wt 249 lb (112.9 kg)  BMI 36.24 kg/m2  EXAM:  GENERAL: alert, no distress and obese  NECK: no adenopathy, no asymmetry, masses, or scars and thyroid normal to palpation  RESP: lungs clear to auscultation - no rales, rhonchi or wheezes  CV: regular rate and rhythm, normal S1 S2, no S3 or S4, no murmur, click or rub, no peripheral edema and peripheral pulses strong  ABDOMEN: soft, nontender, no hepatosplenomegaly, no masses and bowel sounds normal  MS: no gross musculoskeletal defects noted, no " "edema  NEURO: Normal strength and tone, mentation intact and speech normal  PSYCH: mentation appears normal, affect normal/bright    ASSESSMENT/PLAN:       ICD-10-CM    1. Routine general medical examination at a health care facility Z00.00 **Glucose FUTURE 2mo   2. Elevated blood pressure reading without diagnosis of hypertension R03.0    3. Class 2 obesity without serious comorbidity with body mass index (BMI) of 36.0 to 36.9 in adult, unspecified obesity type E66.9 **Glucose FUTURE 2mo    Z68.36 Lipid panel     Fasting glucose and lipid panel ordered, healthy lifestyle modification stressed including regular exercise, balanced diet, limiting salt/caffeine and salt intake.      COUNSELING:  Reviewed preventive health counseling, as reflected in patient instructions    BP Screening:   Last 3 BP Readings:    BP Readings from Last 3 Encounters:   06/12/18 (!) 136/94   07/26/17 124/72   12/09/16 115/75       The following was recommended to the patient:  Re-screen BP within a year and recommended lifestyle modifications   reports that he has never smoked. He has never used smokeless tobacco.    Estimated body mass index is 36.24 kg/(m^2) as calculated from the following:    Height as of this encounter: 5' 9.5\" (1.765 m).    Weight as of this encounter: 249 lb (112.9 kg).   Weight management plan: Discussed healthy diet and exercise guidelines and patient will follow up in 12 months in clinic to re-evaluate.    Counseling Resources:  ATP IV Guidelines  Pooled Cohorts Equation Calculator  FRAX Risk Assessment  ICSI Preventive Guidelines  Dietary Guidelines for Americans, 2010  USDA's MyPlate  ASA Prophylaxis  Lung CA Screening      Berlin Martines MD  [facility]  "

## 2018-06-12 NOTE — MR AVS SNAPSHOT
After Visit Summary   6/12/2018    Martell Dinh    MRN: [id number]           Patient Information     Date Of Birth          1995        Visit Information        Provider Department      6/12/2018 4:20 PM Berlin Martines MD [facility]        Today's Diagnoses     Routine general medical examination at a health care facility    -  1    Elevated blood pressure reading without diagnosis of hypertension        Class 2 obesity without serious comorbidity with body mass index (BMI) of 36.0 to 36.9 in adult, unspecified obesity type          Care Instructions      Preventive Health Recommendations  Male Ages 18 - 25     Yearly exam:             See your health care provider every year in order to  o   Review health changes.   o   Discuss preventive care.    o   Review your medicines if your doctor has prescribed any.    You should be tested each year for STDs (sexually transmitted diseases).     Talk to your provider about cholesterol testing.      If you are at risk for diabetes, you should have a diabetes test (fasting glucose).    Shots: Get a flu shot each year. Get a tetanus shot every 10 years.     Nutrition:    Eat at least 5 servings of fruits and vegetables daily.     Eat whole-grain bread, whole-wheat pasta and brown rice instead of white grains and rice.     Talk to your provider about calcium and Vitamin D.     Lifestyle    Exercise for at least 150 minutes a week (30 minutes a day, 5 days a week). This will help you control your weight and prevent disease.     Limit alcohol to one drink per day.     No smoking.     Wear sunscreen to prevent skin cancer.     See your dentist every six months for an exam and cleaning.     Weight Management: Exercise and Activity    Studies show that people who exercise are the most likely to lose weight and keep it off. Exercise burns calories. It helps build muscle to make your body stronger. Make exercise an important part of your  weight-management plan.  Make activity part of your day  You may not think you have the time to exercise. But you can work activity into your daily life--you just need to be committed. Take 10 minutes out of your lunch hour to take a walk. Walk to the [employer] to get your paper instead of having it delivered. Make it a habit to take the stairs instead of the elevator. Park in a far away parking spot instead of the closest. You ll be surprised at how fast these little changes can make a difference.  Some people really cannot walk very far, and tire out quickly with exercise. Instead of becoming discouraged, resolve to do what you can do, and work to make that a regular frequent habit.   The benefits of exercise  Exercise offers many benefits including:     Exercise increases your metabolism (the speed at which your body burns calories).    Regular exercise can increase the amount of muscle in your body. Muscle burns calories faster than fat. The more muscle you have, the more calories you burn.    Exercise gives you energy and curbs your appetite.    Exercise decreases stress and helps you sleep better.  Make exercise fun  Exercise can be fun. Choose an activity you enjoy. You may even get a friend to do it with you:    Take a resistance-training or aerobics class    Join a team sport    Take a dance class    Walk the dog    Ride a bike  If you have health problems, be sure to ask your healthcare provider before you start an exercise program. Have a  help you develop a plan that s safe for you.   Date Last Reviewed: 2/4/2016 2000-2017 [employer]. [address] [place] [address], [place] [address]. All rights reserved. This information is not intended as a substitute for professional medical care. Always follow your healthcare professional's instructions.                Follow-ups after your visit        Future tests that were ordered for you today     Open Future Orders        Priority  "Expected Expires Ordered    **Glucose FUTURE 2mo Routine 6/26/2018 10/10/2018 6/12/2018    Lipid panel Routine 6/26/2018 6/12/2019 6/12/2018            Who to contact     If you have questions or need follow up information about today's clinic visit or your schedule please contact [facility] directly at [phone].  Normal or non-critical lab and imaging results will be communicated to you by MyChart, letter or phone within 4 business days after the clinic has received the results. If you do not hear from us within 7 days, please contact the clinic through MyChart or phone. If you have a critical or abnormal lab result, we will notify you by phone as soon as possible.  Submit refill requests through [employer] or call your pharmacy and they will forward the refill request to us. Please allow 3 business days for your refill to be completed.          Additional Information About Your Visit        Care EveryWhere ID     This is your Care EveryWhere ID. This could be used by other organizations to access your [place] medical records  [id number]        Your Vitals Were     Pulse Temperature Respirations Height BMI (Body Mass Index)       88 97.8  F (36.6  C) (Tympanic) 18 5' 9.5\" (1.765 m) 36.24 kg/m2        Blood Pressure from Last 3 Encounters:   06/12/18 (!) 136/94   07/26/17 124/72   12/09/16 115/75    Weight from Last 3 Encounters:   06/12/18 249 lb (112.9 kg)   07/26/17 246 lb (111.6 kg) (>99 %)*   01/03/17 254 lb (115.2 kg) (>99 %)*     * Growth percentiles are based on CDC 2-20 Years data.               Primary Care Provider Office Phone # Fax #    Gianfranco Jaffe [phone] [address] [place] [id number]        Equal Access to Services     KERRY GARDINER : Gopi Mendez, dick smith, qakate kaalruthann langford. [facility] [phone].    ATENCIÓN: Si obed haynes, tiene a langford disposición servicios " coy de asistencia lingüística. Omar rizo [phone].    We comply with applicable federal civil rights laws and [place] laws. We do not discriminate on the basis of race, color, national origin, age, disability, sex, sexual orientation, or gender identity.            Thank you!     Thank you for choosing [facility]  for your care. Our goal is always to provide you with excellent care. Hearing back from our patients is one way we can continue to improve our services. Please take a few minutes to complete the written survey that you may receive in the mail after your visit with us. Thank you!             Your Updated Medication List - Protect others around you: Learn how to safely use, store and throw away your medicines at www.disposemymeds.org.      Notice  As of 6/12/2018  4:42 PM    You have not been prescribed any medications.

## 2018-06-12 NOTE — LETTER
[address] [address]  [phone]        October 15, 2018    Martell Dinh  [address] [place] [id number]              Dear Martell Dinh    This is to remind you that your provider wanted you to return to the clinic for fasting lab test(s),    please fast for 10-12 hours. Morning medications can be taken with water.    You may call our office at [phone] to schedule an appointment.    Please disregard this notice if you have already had your labs drawn or made an appointment.          Sincerely,        Berlin Martines MD

## 2018-06-12 NOTE — NURSING NOTE
"Chief Complaint   Patient presents with     Physical       Initial BP (!) 136/94 (Cuff Size: Adult Regular)  Pulse 88  Temp 97.8  F (36.6  C) (Tympanic)  Resp 18  Ht 5' 9.5\" (1.765 m)  Wt 249 lb (112.9 kg)  BMI 36.24 kg/m2 Estimated body mass index is 36.24 kg/(m^2) as calculated from the following:    Height as of this encounter: 5' 9.5\" (1.765 m).    Weight as of this encounter: 249 lb (112.9 kg).      Health Maintenance that is potentially due pending provider review:  NONE    n/a    Is there anyone who you would like to be able to receive your results? Not Applicable  If yes have patient fill out SCOTT    "

## 2018-06-12 NOTE — PATIENT INSTRUCTIONS
Preventive Health Recommendations  Male Ages 18 - 25     Yearly exam:             See your health care provider every year in order to  o   Review health changes.   o   Discuss preventive care.    o   Review your medicines if your doctor has prescribed any.    You should be tested each year for STDs (sexually transmitted diseases).     Talk to your provider about cholesterol testing.      If you are at risk for diabetes, you should have a diabetes test (fasting glucose).    Shots: Get a flu shot each year. Get a tetanus shot every 10 years.     Nutrition:    Eat at least 5 servings of fruits and vegetables daily.     Eat whole-grain bread, whole-wheat pasta and brown rice instead of white grains and rice.     Talk to your provider about calcium and Vitamin D.     Lifestyle    Exercise for at least 150 minutes a week (30 minutes a day, 5 days a week). This will help you control your weight and prevent disease.     Limit alcohol to one drink per day.     No smoking.     Wear sunscreen to prevent skin cancer.     See your dentist every six months for an exam and cleaning.     Weight Management: Exercise and Activity    Studies show that people who exercise are the most likely to lose weight and keep it off. Exercise burns calories. It helps build muscle to make your body stronger. Make exercise an important part of your weight-management plan.  Make activity part of your day  You may not think you have the time to exercise. But you can work activity into your daily life--you just need to be committed. Take 10 minutes out of your lunch hour to take a walk. Walk to the [employer] to get your paper instead of having it delivered. Make it a habit to take the stairs instead of the elevator. Park in a far away parking spot instead of the closest. You ll be surprised at how fast these little changes can make a difference.  Some people really cannot walk very far, and tire out quickly with exercise. Instead of becoming  discouraged, resolve to do what you can do, and work to make that a regular frequent habit.   The benefits of exercise  Exercise offers many benefits including:     Exercise increases your metabolism (the speed at which your body burns calories).    Regular exercise can increase the amount of muscle in your body. Muscle burns calories faster than fat. The more muscle you have, the more calories you burn.    Exercise gives you energy and curbs your appetite.    Exercise decreases stress and helps you sleep better.  Make exercise fun  Exercise can be fun. Choose an activity you enjoy. You may even get a friend to do it with you:    Take a resistance-training or aerobics class    Join a team sport    Take a dance class    Walk the dog    Ride a bike  If you have health problems, be sure to ask your healthcare provider before you start an exercise program. Have a  help you develop a plan that s safe for you.   Date Last Reviewed: 2/4/2016 2000-2017 [employer]. [address] [place] [address], [place] [address]. All rights reserved. This information is not intended as a substitute for professional medical care. Always follow your healthcare professional's instructions.

## 2018-11-21 ENCOUNTER — TELEPHONE (OUTPATIENT)
Dept: FAMILY MEDICINE | Facility: CLINIC | Age: 23
End: 2018-11-21

## 2018-11-21 NOTE — TELEPHONE ENCOUNTER
Patient was told to return for fasting labs, reminder letter was sent to patient on 10/15/2018, patient has still not scheduled an appointment.

## 2022-03-03 NOTE — PROGRESS NOTES
"Sports Medicine Clinic Visit    PCP: Gianfranco Jaffe    Martell Dinh is a 21 year old male who is seen  in follow-up presenting with continued right Shoulder pain. Is here to discuss MRI results.    Injury: Patient reports a \"shoulder strain\" 4 years in football.  He rested for a while and his pain was better after that, however, pain has been worsening over the last year.  He reports subluxation episodes when playing football.  Pain is well localized to shoulder, does go up to trapezius and down to biceps.  Worse with moving or lifting.  - Primary care has already ordered MRI and physical therapy.  He has not had imaging or started PT yet.    Location of Pain: shoulder into the top of his shoulder/trapezius muscle, sometimes shoots into his bicep  Duration of Pain: 1 year(s) on chronic  Rating of Pain at worst: 4/10  Rating of Pain Currently: 2/10  Symptoms are better with: rest  Symptoms are worse with: overhead motions: reaching up above shoulders  Additional Features:   Positive: none   Negative: swelling, bruising, popping, grinding, catching, locking, instability, paresthesias, numbness, weakness, pain in other joints and systemic symptoms  Other evaluation and/or treatments so far consists of: chiropractor, no relief  Prior History of related problems: chronic    Social History: gas station cashier    Review of Systems  Musculoskeletal: as above  Remainder of review of systems is negative including constitutional, CV, pulmonary, GI, Skin and Neurologic except as noted in HPI or medical history.    Family history, medical history and surgical history have all been discussed with patient and appended to medical chart below.    History reviewed. No pertinent past medical history.  Past Surgical History   Procedure Laterality Date     Surgical history of -   1997     Circumcision     Family History   Problem Relation Age of Onset     HEART DISEASE Mother      Lipids Mother      DIABETES Maternal " Call received on back line from [facility] and pre admission testing center  Staff informed me that patient is there and is supposed to have EKG for upcoming procedure and there are no orders in the chart  Attempted to reach out to Dr Adriana Wayne surgery scheduler, but she was unavailable  Spoke with another surgery scheduler who helped research patient's chart and it does not appear that EKG was ordered  Informed staff member and patient of this information  Pt understands but is upset because he went to 3 different locations to try and have this done because it is highlighted and circled on his pre admission paperwork  I informed patient that I will contact Dr Adriana Wayne surgery scheduler to clarify for patient  "Grandmother      Hypertension Maternal Grandmother      Objective  Ht 5' 9.5\" (1.765 m)  Wt 254 lb (115.214 kg)  BMI 36.98 kg/m2  Constitutional:well-developed, well-nourished, and in no distress.   Cardiovascular: Intact distal pulses.    Neurological: alert. Gait normal  Skin: Skin is warm and dry.   Psychiatric: Mood and affect normal.   Respiratory: unlabored, speaks in full sentences  Hematologic/Lymphatic/Immunologic: neg lymphadenopathy, neg lymphedema    Exam:  Bilateral Shoulder exam    ROM:        Full active and passive ROM with flexion, extension, abduction, internal and external rotation.    Tender:        Glenohumeral joint line - right shoulder       Bicipital groove - right shoulder    Non Tender:       remainder of shoulder    Strength:        abduction 4/5       internal rotation 5/5       external rotation 5/5       adduction 5/5    Impingement testing:        neg (-) Neer       neg (-) Molina       neg (-) empty can       Negative speed's test       Negative Yergason test      Stability testing:       neg (-) anterior glide       neg (-) posterior compression       neg (-) scapular stability     Skin:        no visible deformities       well perfused       capillary refill brisk    Sensation:        normal sensation over shoulder and upper extremity         Radiology:  Results for orders placed or performed during the hospital encounter of 12/16/16   MR Shoulder Right w Contrast    Narrative    MR ARTHROGRAM RIGHT SHOULDER  12/16/2016 2:34 PM    HISTORY: Three years of right shoulder pain. The concern is for a  labral tear.    COMPARISON: None.    TECHNIQUE: Following the injection of gadolinium contrast into the  right glenohumeral joint, coronal T1 fat suppressed, STIR, and T2,  sagittal T1 fat suppressed and T2 fat suppressed, and transverse T1  fat suppressed and gradient echo images were obtained through the  right shoulder.    FINDINGS:  Osseous acromion outlet: There is a type I " configuration of the  acromion with mild lateral and no significant anterior downsloping.  There are no degenerative changes of the acromioclavicular joint. The  outlet is widely patent. No os acromiale.    Rotator cuff: The supraspinatus, infraspinatus, subscapularis, and  teres minor tendons and visualized muscles are normal.     Labrum: Contrast is seen between the majority of the posterior labrum  and glenoid. This is best demonstrated on the transverse series 10  images 13-17.    Biceps tendon: The biceps tendon is in the bicipital groove. It is  intact and demonstrates normal signal.    Osseous structures and cartilaginous surfaces: There is moderate  cortical indentation with underlying marrow edema in the  posterolateral aspect of the humeral head apex. No other abnormal  marrow signal intensity is identified. The cartilage surfaces are well  preserved.    Additional findings: The glenohumeral joint is well distended with  contrast. No fluid within the subacromial-subdeltoid bursa. The  adjacent soft tissues are unremarkable.      Impression    IMPRESSION:   1. Moderate size Hill-Sachs lesion with underlying marrow edema  consistent with a previous anterior glenohumeral joint dislocation.  There is no corresponding Bankart lesion.  2. Posterior labral tear.    LULA LAWTON MD       I have personally reviewed images with patient    Assessment:  1. Chronic right shoulder pain    2. Superior glenoid labrum lesion of right shoulder, subsequent encounter        Plan:  Discussed the assessment with the patient.  I was able to apprise patient and his mother the importance of continuing with a shoulder rehabilitation program.  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  We discussed risks and  benefits of corticosteroid intra-articular injection therapy.  He reports that his symptoms have much improved and he is not interested in this treatment option.  He prefers to continue with physical therapy.  They are advised to return to clinic as needed.  Again apprised them tat his healing can take up to a year for full recovery.        Brianda Hill PA-C  [facility]  [facility]      Disclaimer: This note consists of symbols derived from keyboarding, dictation and/or voice recognition software. As a result, there may be errors in the script that have gone undetected. Please consider this when interpreting information found in this chart.

## 2025-07-29 ENCOUNTER — NURSE TRIAGE (OUTPATIENT)
Dept: FAMILY MEDICINE | Facility: CLINIC | Age: 30
End: 2025-07-29
Payer: COMMERCIAL

## 2025-07-29 NOTE — TELEPHONE ENCOUNTER
Patient calling to schedule an appointment due to having slight chest pain intermittently that started 3-4 weeks ago.     Pain is located on the left side of the chest and just above the nipple line and has no radiation. Pain started 3-4 weeks, it comes and goes and lasts anywhere from a few minutes up to an hour. Last occurrence was about 2 weeks ago when he was doing manual labor of building a shed.  Pain is rated 1-2 out of 10 and is not having any pain right now. Patient has history of high blood pressure and is not checking blood pressures and has not been to the doctor in a long time since 2018. Father  from a heart attack in 2019 at age 50.    Patient is not aware of anything that could be causing the chest pain. Denies any other symptoms.    RN scheduled patient for an office visit for evaluation on 25.    Additional Information   Negative: SEVERE difficulty breathing (e.g., struggling for each breath, speaks in single words)   Negative: Difficult to awaken or acting confused (e.g., disoriented, slurred speech)   Negative: Shock suspected (e.g., cold/pale/clammy skin, too weak to stand, low BP, rapid pulse)   Negative: Passed out (i.e., lost consciousness, collapsed and was not responding)   Negative: Chest pain lasting longer than 5 minutes and ANY of the following:         Pain is crushing, pressure-like, or heavy         Over 44 years old          Over 30 years old and one cardiac risk factor (e.g diabetes, high blood pressure, high cholesterol, smoker, or family history of heart disease)         History of heart disease (e.g. angina, heart attack, heart failure, bypass surgery, takes nitroglycerin)   Negative: Heart beating < 50 beats per minute OR > 140 beats per minute   Negative: Visible sweat on face or sweat dripping down face   Negative: Sounds like a life-threatening emergency to the triager   Negative: Followed an injury to chest   Negative: SEVERE chest pain   Negative: Pain also in  shoulder(s) or arm(s) or jaw   Negative: Difficulty breathing   Negative: Cocaine use within last 3 days   Negative: Major surgery in the past month   Negative: Hip or leg fracture (broken bone) in past month (or had cast on leg or ankle in past month)   Negative: Illness requiring prolonged bedrest in past month (e.g., immobilization, long hospital stay)   Negative: Long-distance travel in past month (e.g., car, bus, train, plane; with trip lasting 6 or more hours)   Negative: History of prior 'blood clot' in leg or lungs (i.e., deep vein thrombosis, pulmonary embolism)   Negative: History of inherited increased risk of blood clots (e.g., Factor 5 Leiden, Anti-thrombin 3, Protein C or Protein S deficiency, Prothrombin mutation)   Negative: Cancer treatment in the past two months (or has cancer now)   Negative: Heart beating irregularly or very rapidly   Negative: Chest pain lasting longer than 5 minutes and occurred in last 3 days (72 hours) (Exception: Feels exactly the same as previously diagnosed heartburn and has accompanying sour taste in mouth.)   Negative: Chest pain or 'angina' comes and goes and is happening more often (increasing in frequency) or getting worse (increasing in severity) (Exception: Chest pains that last only a few seconds.)   Negative: Dizziness or lightheadedness   Negative: Coughing up blood   Negative: Patient sounds very sick or weak to the triager   Negative: Patient says chest pain feels exactly the same as previously diagnosed 'heartburn' and describes burning in chest and accompanying sour taste in mouth   Negative: Fever > 100.4 F (38.0 C)   Negative: Chest pain(s) lasting a few seconds persists > 3 days   Negative: Rash in same area as pain (may be described as 'small blisters')   Negative: All other patients with chest pain (Exception: Fleeting chest pain lasting a few seconds.)   Negative: Patient wants to be seen   Negative: Chest pain(s) lasting a few seconds from coughing    Negative: Chest pain(s) lasting a few seconds    Protocols used: Chest Pain-A-[place]

## 2025-07-31 ENCOUNTER — OFFICE VISIT (OUTPATIENT)
Dept: FAMILY MEDICINE | Facility: CLINIC | Age: 30
End: 2025-07-31
Payer: COMMERCIAL

## 2025-07-31 VITALS
RESPIRATION RATE: 16 BRPM | BODY MASS INDEX: 37.24 KG/M2 | SYSTOLIC BLOOD PRESSURE: 120 MMHG | HEIGHT: 71 IN | DIASTOLIC BLOOD PRESSURE: 82 MMHG | WEIGHT: 266 LBS | HEART RATE: 67 BPM | TEMPERATURE: 97.9 F | OXYGEN SATURATION: 98 %

## 2025-07-31 DIAGNOSIS — E66.812 CLASS 2 OBESITY WITHOUT SERIOUS COMORBIDITY WITH BODY MASS INDEX (BMI) OF 36.0 TO 36.9 IN ADULT, UNSPECIFIED OBESITY TYPE: ICD-10-CM

## 2025-07-31 DIAGNOSIS — Z13.6 SCREENING FOR CARDIOVASCULAR CONDITION: ICD-10-CM

## 2025-07-31 DIAGNOSIS — Z11.59 NEED FOR HEPATITIS C SCREENING TEST: ICD-10-CM

## 2025-07-31 DIAGNOSIS — Z11.4 SCREENING FOR HIV (HUMAN IMMUNODEFICIENCY VIRUS): ICD-10-CM

## 2025-07-31 DIAGNOSIS — R07.9 NONSPECIFIC CHEST PAIN: Primary | ICD-10-CM

## 2025-07-31 DIAGNOSIS — Z82.49 FAMILY HISTORY OF HEART DISEASE: ICD-10-CM

## 2025-07-31 LAB
ALBUMIN SERPL BCG-MCNC: 4.3 G/DL (ref 3.5–5.2)
ALP SERPL-CCNC: 81 U/L (ref 40–150)
ALT SERPL W P-5'-P-CCNC: 59 U/L (ref 0–70)
ANION GAP SERPL CALCULATED.3IONS-SCNC: 12 MMOL/L (ref 7–15)
AST SERPL W P-5'-P-CCNC: 30 U/L (ref 0–45)
BASOPHILS # BLD AUTO: 0 10E3/UL (ref 0–0.2)
BASOPHILS NFR BLD AUTO: 0 %
BILIRUB SERPL-MCNC: 0.8 MG/DL
BUN SERPL-MCNC: 15.1 MG/DL (ref 6–20)
CALCIUM SERPL-MCNC: 9.2 MG/DL (ref 8.8–10.4)
CHLORIDE SERPL-SCNC: 103 MMOL/L (ref 98–107)
CHOLEST SERPL-MCNC: 200 MG/DL
CREAT SERPL-MCNC: 0.93 MG/DL (ref 0.67–1.17)
EGFRCR SERPLBLD CKD-EPI 2021: >90 ML/MIN/1.73M2
EOSINOPHIL # BLD AUTO: 0.1 10E3/UL (ref 0–0.7)
EOSINOPHIL NFR BLD AUTO: 1 %
ERYTHROCYTE [DISTWIDTH] IN BLOOD BY AUTOMATED COUNT: 11.7 % (ref 10–15)
FASTING STATUS PATIENT QL REPORTED: NO
FASTING STATUS PATIENT QL REPORTED: NO
GLUCOSE SERPL-MCNC: 92 MG/DL (ref 70–99)
HCO3 SERPL-SCNC: 21 MMOL/L (ref 22–29)
HCT VFR BLD AUTO: 43.7 % (ref 40–53)
HDLC SERPL-MCNC: 37 MG/DL
HGB BLD-MCNC: 14.8 G/DL (ref 13.3–17.7)
IMM GRANULOCYTES # BLD: 0 10E3/UL
IMM GRANULOCYTES NFR BLD: 0 %
LDLC SERPL CALC-MCNC: 148 MG/DL
LYMPHOCYTES # BLD AUTO: 1.4 10E3/UL (ref 0.8–5.3)
LYMPHOCYTES NFR BLD AUTO: 25 %
MCH RBC QN AUTO: 29.6 PG (ref 26.5–33)
MCHC RBC AUTO-ENTMCNC: 33.9 G/DL (ref 31.5–36.5)
MCV RBC AUTO: 87 FL (ref 78–100)
MONOCYTES # BLD AUTO: 0.6 10E3/UL (ref 0–1.3)
MONOCYTES NFR BLD AUTO: 12 %
NEUTROPHILS # BLD AUTO: 3.4 10E3/UL (ref 1.6–8.3)
NEUTROPHILS NFR BLD AUTO: 61 %
NONHDLC SERPL-MCNC: 163 MG/DL
PLATELET # BLD AUTO: 244 10E3/UL (ref 150–450)
POTASSIUM SERPL-SCNC: 4.1 MMOL/L (ref 3.4–5.3)
PROT SERPL-MCNC: 7.2 G/DL (ref 6.4–8.3)
RBC # BLD AUTO: 5 10E6/UL (ref 4.4–5.9)
SODIUM SERPL-SCNC: 136 MMOL/L (ref 135–145)
TRIGL SERPL-MCNC: 76 MG/DL
TROPONIN T SERPL HS-MCNC: 7 NG/L
WBC # BLD AUTO: 5.5 10E3/UL (ref 4–11)

## 2025-07-31 ASSESSMENT — PAIN SCALES - GENERAL: PAINLEVEL_OUTOF10: NO PAIN (0)

## 2025-07-31 NOTE — PATIENT INSTRUCTIONS
Will check labwork today   EKG of your heart  Follow up based on results   Follow up in 1 month   Rule out cardiac causes, does sound more muscular related.

## 2025-08-02 ENCOUNTER — HEALTH MAINTENANCE LETTER (OUTPATIENT)
Age: 30
End: 2025-08-02

## 2025-08-04 ENCOUNTER — PATIENT OUTREACH (OUTPATIENT)
Dept: CARE COORDINATION | Facility: CLINIC | Age: 30
End: 2025-08-04
Payer: COMMERCIAL